# Patient Record
Sex: FEMALE | Race: OTHER | NOT HISPANIC OR LATINO | URBAN - METROPOLITAN AREA
[De-identification: names, ages, dates, MRNs, and addresses within clinical notes are randomized per-mention and may not be internally consistent; named-entity substitution may affect disease eponyms.]

---

## 2023-10-16 ENCOUNTER — APPOINTMENT (OUTPATIENT)
Dept: RADIOLOGY | Facility: HOSPITAL | Age: 68
End: 2023-10-16
Payer: COMMERCIAL

## 2023-10-16 ENCOUNTER — HOSPITAL ENCOUNTER (INPATIENT)
Facility: HOSPITAL | Age: 68
LOS: 3 days | Discharge: HOME | End: 2023-10-19
Attending: INTERNAL MEDICINE | Admitting: INTERNAL MEDICINE
Payer: COMMERCIAL

## 2023-10-16 DIAGNOSIS — R42 DIZZINESS: Primary | ICD-10-CM

## 2023-10-16 LAB
ALBUMIN SERPL BCP-MCNC: 5.2 G/DL (ref 3.4–5)
ALP SERPL-CCNC: 34 U/L (ref 33–136)
ALT SERPL W P-5'-P-CCNC: 33 U/L (ref 7–45)
ANION GAP SERPL CALC-SCNC: 17 MMOL/L (ref 10–20)
APPEARANCE UR: CLEAR
APTT PPP: 29 SECONDS (ref 27–38)
AST SERPL W P-5'-P-CCNC: 43 U/L (ref 9–39)
BASOPHILS # BLD AUTO: 0.05 X10*3/UL (ref 0–0.1)
BASOPHILS NFR BLD AUTO: 0.6 %
BILIRUB SERPL-MCNC: 0.8 MG/DL (ref 0–1.2)
BILIRUB UR STRIP.AUTO-MCNC: NEGATIVE MG/DL
BUN SERPL-MCNC: 10 MG/DL (ref 6–23)
CALCIUM SERPL-MCNC: 10.3 MG/DL (ref 8.6–10.3)
CARDIAC TROPONIN I PNL SERPL HS: 3 NG/L (ref 0–13)
CHLORIDE SERPL-SCNC: 106 MMOL/L (ref 98–107)
CO2 SERPL-SCNC: 22 MMOL/L (ref 21–32)
COLOR UR: ABNORMAL
CREAT SERPL-MCNC: 0.63 MG/DL (ref 0.5–1.05)
EOSINOPHIL # BLD AUTO: 0.07 X10*3/UL (ref 0–0.7)
EOSINOPHIL NFR BLD AUTO: 0.8 %
ERYTHROCYTE [DISTWIDTH] IN BLOOD BY AUTOMATED COUNT: 12.9 % (ref 11.5–14.5)
GFR SERPL CREATININE-BSD FRML MDRD: >90 ML/MIN/1.73M*2
GLUCOSE BLD MANUAL STRIP-MCNC: 123 MG/DL (ref 74–99)
GLUCOSE BLD MANUAL STRIP-MCNC: 193 MG/DL (ref 74–99)
GLUCOSE BLD MANUAL STRIP-MCNC: 93 MG/DL (ref 74–99)
GLUCOSE SERPL-MCNC: 121 MG/DL (ref 74–99)
GLUCOSE UR STRIP.AUTO-MCNC: NEGATIVE MG/DL
HCT VFR BLD AUTO: 47.1 % (ref 36–46)
HGB BLD-MCNC: 16.2 G/DL (ref 12–16)
HOLD SPECIMEN: NORMAL
IMM GRANULOCYTES # BLD AUTO: 0.02 X10*3/UL (ref 0–0.7)
IMM GRANULOCYTES NFR BLD AUTO: 0.2 % (ref 0–0.9)
INR PPP: 1 (ref 0.9–1.1)
KETONES UR STRIP.AUTO-MCNC: ABNORMAL MG/DL
LEUKOCYTE ESTERASE UR QL STRIP.AUTO: NEGATIVE
LIPASE SERPL-CCNC: 35 U/L (ref 9–82)
LIPASE SERPL-CCNC: 44 U/L (ref 9–82)
LYMPHOCYTES # BLD AUTO: 2.24 X10*3/UL (ref 1.2–4.8)
LYMPHOCYTES NFR BLD AUTO: 26.2 %
MAGNESIUM SERPL-MCNC: 1.93 MG/DL (ref 1.6–2.4)
MCH RBC QN AUTO: 30.2 PG (ref 26–34)
MCHC RBC AUTO-ENTMCNC: 34.4 G/DL (ref 32–36)
MCV RBC AUTO: 88 FL (ref 80–100)
MONOCYTES # BLD AUTO: 0.46 X10*3/UL (ref 0.1–1)
MONOCYTES NFR BLD AUTO: 5.4 %
NEUTROPHILS # BLD AUTO: 5.7 X10*3/UL (ref 1.2–7.7)
NEUTROPHILS NFR BLD AUTO: 66.8 %
NITRITE UR QL STRIP.AUTO: NEGATIVE
NRBC BLD-RTO: 0 /100 WBCS (ref 0–0)
PH UR STRIP.AUTO: 9 [PH]
PLATELET # BLD AUTO: 182 X10*3/UL (ref 150–450)
PMV BLD AUTO: 11.3 FL (ref 7.5–11.5)
POCT GLUCOSE: 123 MG/DL (ref 74–99)
POTASSIUM SERPL-SCNC: 5.5 MMOL/L (ref 3.5–5.3)
PROT SERPL-MCNC: 7.7 G/DL (ref 6.4–8.2)
PROT UR STRIP.AUTO-MCNC: NEGATIVE MG/DL
PROTHROMBIN TIME: 11.8 SECONDS (ref 9.8–12.8)
RBC # BLD AUTO: 5.37 X10*6/UL (ref 4–5.2)
RBC # UR STRIP.AUTO: NEGATIVE /UL
SODIUM SERPL-SCNC: 139 MMOL/L (ref 136–145)
SP GR UR STRIP.AUTO: 1.03
UROBILINOGEN UR STRIP.AUTO-MCNC: <2 MG/DL
WBC # BLD AUTO: 8.5 X10*3/UL (ref 4.4–11.3)

## 2023-10-16 PROCEDURE — 82947 ASSAY GLUCOSE BLOOD QUANT: CPT

## 2023-10-16 PROCEDURE — 74176 CT ABD & PELVIS W/O CONTRAST: CPT | Performed by: RADIOLOGY

## 2023-10-16 PROCEDURE — 96375 TX/PRO/DX INJ NEW DRUG ADDON: CPT

## 2023-10-16 PROCEDURE — 99285 EMERGENCY DEPT VISIT HI MDM: CPT | Performed by: INTERNAL MEDICINE

## 2023-10-16 PROCEDURE — 2500000002 HC RX 250 W HCPCS SELF ADMINISTERED DRUGS (ALT 637 FOR MEDICARE OP, ALT 636 FOR OP/ED): Performed by: STUDENT IN AN ORGANIZED HEALTH CARE EDUCATION/TRAINING PROGRAM

## 2023-10-16 PROCEDURE — 81003 URINALYSIS AUTO W/O SCOPE: CPT | Performed by: STUDENT IN AN ORGANIZED HEALTH CARE EDUCATION/TRAINING PROGRAM

## 2023-10-16 PROCEDURE — 2500000004 HC RX 250 GENERAL PHARMACY W/ HCPCS (ALT 636 FOR OP/ED): Performed by: STUDENT IN AN ORGANIZED HEALTH CARE EDUCATION/TRAINING PROGRAM

## 2023-10-16 PROCEDURE — C9113 INJ PANTOPRAZOLE SODIUM, VIA: HCPCS | Performed by: INTERNAL MEDICINE

## 2023-10-16 PROCEDURE — 36415 COLL VENOUS BLD VENIPUNCTURE: CPT | Performed by: INTERNAL MEDICINE

## 2023-10-16 PROCEDURE — 70496 CT ANGIOGRAPHY HEAD: CPT | Performed by: RADIOLOGY

## 2023-10-16 PROCEDURE — 1200000002 HC GENERAL ROOM WITH TELEMETRY DAILY

## 2023-10-16 PROCEDURE — 2580000001 HC RX 258 IV SOLUTIONS: Performed by: STUDENT IN AN ORGANIZED HEALTH CARE EDUCATION/TRAINING PROGRAM

## 2023-10-16 PROCEDURE — 74176 CT ABD & PELVIS W/O CONTRAST: CPT

## 2023-10-16 PROCEDURE — 70553 MRI BRAIN STEM W/O & W/DYE: CPT | Performed by: STUDENT IN AN ORGANIZED HEALTH CARE EDUCATION/TRAINING PROGRAM

## 2023-10-16 PROCEDURE — 80053 COMPREHEN METABOLIC PANEL: CPT | Performed by: INTERNAL MEDICINE

## 2023-10-16 PROCEDURE — 70498 CT ANGIOGRAPHY NECK: CPT

## 2023-10-16 PROCEDURE — 85610 PROTHROMBIN TIME: CPT | Performed by: INTERNAL MEDICINE

## 2023-10-16 PROCEDURE — A9575 INJ GADOTERATE MEGLUMI 0.1ML: HCPCS | Performed by: INTERNAL MEDICINE

## 2023-10-16 PROCEDURE — 83735 ASSAY OF MAGNESIUM: CPT | Performed by: STUDENT IN AN ORGANIZED HEALTH CARE EDUCATION/TRAINING PROGRAM

## 2023-10-16 PROCEDURE — 85730 THROMBOPLASTIN TIME PARTIAL: CPT | Performed by: INTERNAL MEDICINE

## 2023-10-16 PROCEDURE — 2500000001 HC RX 250 WO HCPCS SELF ADMINISTERED DRUGS (ALT 637 FOR MEDICARE OP): Performed by: STUDENT IN AN ORGANIZED HEALTH CARE EDUCATION/TRAINING PROGRAM

## 2023-10-16 PROCEDURE — 85025 COMPLETE CBC W/AUTO DIFF WBC: CPT | Performed by: INTERNAL MEDICINE

## 2023-10-16 PROCEDURE — 70496 CT ANGIOGRAPHY HEAD: CPT

## 2023-10-16 PROCEDURE — 96374 THER/PROPH/DIAG INJ IV PUSH: CPT

## 2023-10-16 PROCEDURE — 84484 ASSAY OF TROPONIN QUANT: CPT | Performed by: INTERNAL MEDICINE

## 2023-10-16 PROCEDURE — 2500000004 HC RX 250 GENERAL PHARMACY W/ HCPCS (ALT 636 FOR OP/ED): Performed by: INTERNAL MEDICINE

## 2023-10-16 PROCEDURE — 2550000001 HC RX 255 CONTRASTS: Performed by: INTERNAL MEDICINE

## 2023-10-16 PROCEDURE — 70553 MRI BRAIN STEM W/O & W/DYE: CPT

## 2023-10-16 PROCEDURE — 83690 ASSAY OF LIPASE: CPT | Performed by: STUDENT IN AN ORGANIZED HEALTH CARE EDUCATION/TRAINING PROGRAM

## 2023-10-16 PROCEDURE — 70498 CT ANGIOGRAPHY NECK: CPT | Performed by: RADIOLOGY

## 2023-10-16 PROCEDURE — 70450 CT HEAD/BRAIN W/O DYE: CPT | Performed by: RADIOLOGY

## 2023-10-16 PROCEDURE — 36415 COLL VENOUS BLD VENIPUNCTURE: CPT | Performed by: STUDENT IN AN ORGANIZED HEALTH CARE EDUCATION/TRAINING PROGRAM

## 2023-10-16 PROCEDURE — 70450 CT HEAD/BRAIN W/O DYE: CPT

## 2023-10-16 RX ORDER — OMEPRAZOLE 40 MG/1
40 CAPSULE, DELAYED RELEASE ORAL
COMMUNITY
Start: 2023-05-26

## 2023-10-16 RX ORDER — ERGOCALCIFEROL 1.25 MG/1
1 CAPSULE ORAL WEEKLY
COMMUNITY
Start: 2023-06-13

## 2023-10-16 RX ORDER — PANTOPRAZOLE SODIUM 40 MG/10ML
40 INJECTION, POWDER, LYOPHILIZED, FOR SOLUTION INTRAVENOUS 2 TIMES DAILY
Status: DISCONTINUED | OUTPATIENT
Start: 2023-10-17 | End: 2023-10-18

## 2023-10-16 RX ORDER — DEXTROSE 50 % IN WATER (D50W) INTRAVENOUS SYRINGE
25
Status: DISCONTINUED | OUTPATIENT
Start: 2023-10-16 | End: 2023-10-17

## 2023-10-16 RX ORDER — PANTOPRAZOLE SODIUM 40 MG/10ML
80 INJECTION, POWDER, LYOPHILIZED, FOR SOLUTION INTRAVENOUS ONCE
Status: COMPLETED | OUTPATIENT
Start: 2023-10-16 | End: 2023-10-16

## 2023-10-16 RX ORDER — MELOXICAM 15 MG/1
1 TABLET ORAL DAILY PRN
COMMUNITY
Start: 2023-08-22

## 2023-10-16 RX ORDER — LORAZEPAM 2 MG/ML
0.5 INJECTION INTRAMUSCULAR ONCE
Status: COMPLETED | OUTPATIENT
Start: 2023-10-16 | End: 2023-10-16

## 2023-10-16 RX ORDER — DIPHENHYDRAMINE HYDROCHLORIDE 50 MG/ML
12.5 INJECTION INTRAMUSCULAR; INTRAVENOUS ONCE
Status: COMPLETED | OUTPATIENT
Start: 2023-10-16 | End: 2023-10-16

## 2023-10-16 RX ORDER — SODIUM CHLORIDE, SODIUM LACTATE, POTASSIUM CHLORIDE, CALCIUM CHLORIDE 600; 310; 30; 20 MG/100ML; MG/100ML; MG/100ML; MG/100ML
100 INJECTION, SOLUTION INTRAVENOUS CONTINUOUS
Status: DISCONTINUED | OUTPATIENT
Start: 2023-10-16 | End: 2023-10-17

## 2023-10-16 RX ORDER — METOCLOPRAMIDE HYDROCHLORIDE 5 MG/ML
10 INJECTION INTRAMUSCULAR; INTRAVENOUS ONCE
Status: COMPLETED | OUTPATIENT
Start: 2023-10-16 | End: 2023-10-16

## 2023-10-16 RX ORDER — ATORVASTATIN CALCIUM 80 MG/1
80 TABLET, FILM COATED ORAL NIGHTLY
Status: DISCONTINUED | OUTPATIENT
Start: 2023-10-16 | End: 2023-10-19 | Stop reason: HOSPADM

## 2023-10-16 RX ORDER — DEXTROSE MONOHYDRATE 100 MG/ML
0.3 INJECTION, SOLUTION INTRAVENOUS ONCE AS NEEDED
Status: DISCONTINUED | OUTPATIENT
Start: 2023-10-16 | End: 2023-10-16

## 2023-10-16 RX ORDER — ALENDRONATE SODIUM 70 MG/1
1 TABLET ORAL
COMMUNITY
Start: 2023-04-24

## 2023-10-16 RX ORDER — INSULIN LISPRO 100 [IU]/ML
0-5 INJECTION, SOLUTION INTRAVENOUS; SUBCUTANEOUS
Status: DISCONTINUED | OUTPATIENT
Start: 2023-10-16 | End: 2023-10-19 | Stop reason: HOSPADM

## 2023-10-16 RX ORDER — METFORMIN HYDROCHLORIDE 500 MG/1
1000 TABLET, EXTENDED RELEASE ORAL
COMMUNITY
Start: 2023-03-06

## 2023-10-16 RX ORDER — ATORVASTATIN CALCIUM 40 MG/1
40 TABLET, FILM COATED ORAL
COMMUNITY
Start: 2023-10-09

## 2023-10-16 RX ORDER — CALCIUM CARBONATE/VITAMIN D3 600MG-5MCG
1 TABLET ORAL DAILY
COMMUNITY
Start: 2021-04-22

## 2023-10-16 RX ORDER — GADOTERATE MEGLUMINE 376.9 MG/ML
11 INJECTION INTRAVENOUS
Status: COMPLETED | OUTPATIENT
Start: 2023-10-16 | End: 2023-10-16

## 2023-10-16 RX ORDER — LANOLIN ALCOHOL/MO/W.PET/CERES
1000 CREAM (GRAM) TOPICAL
COMMUNITY
Start: 2017-11-02

## 2023-10-16 RX ADMIN — METOCLOPRAMIDE 10 MG: 5 INJECTION, SOLUTION INTRAMUSCULAR; INTRAVENOUS at 14:12

## 2023-10-16 RX ADMIN — DIPHENHYDRAMINE HYDROCHLORIDE 12.5 MG: 50 INJECTION, SOLUTION INTRAMUSCULAR; INTRAVENOUS at 14:12

## 2023-10-16 RX ADMIN — ATORVASTATIN CALCIUM 40 MG: 80 TABLET, FILM COATED ORAL at 20:21

## 2023-10-16 RX ADMIN — PANTOPRAZOLE SODIUM 80 MG: 40 INJECTION, POWDER, FOR SOLUTION INTRAVENOUS at 14:41

## 2023-10-16 RX ADMIN — SODIUM CHLORIDE, POTASSIUM CHLORIDE, SODIUM LACTATE AND CALCIUM CHLORIDE 100 ML/HR: 600; 310; 30; 20 INJECTION, SOLUTION INTRAVENOUS at 20:21

## 2023-10-16 RX ADMIN — SODIUM CHLORIDE, POTASSIUM CHLORIDE, SODIUM LACTATE AND CALCIUM CHLORIDE 1000 ML: 600; 310; 30; 20 INJECTION, SOLUTION INTRAVENOUS at 17:57

## 2023-10-16 RX ADMIN — LORAZEPAM 0.5 MG: 2 INJECTION INTRAMUSCULAR; INTRAVENOUS at 18:57

## 2023-10-16 RX ADMIN — GADOTERATE MEGLUMINE 11 ML: 376.9 INJECTION INTRAVENOUS at 19:49

## 2023-10-16 RX ADMIN — IOHEXOL 100 ML: 350 INJECTION, SOLUTION INTRAVENOUS at 12:34

## 2023-10-16 RX ADMIN — INSULIN LISPRO 1 UNITS: 100 INJECTION, SOLUTION INTRAVENOUS; SUBCUTANEOUS at 18:26

## 2023-10-16 SDOH — SOCIAL STABILITY: SOCIAL INSECURITY: HAVE YOU HAD THOUGHTS OF HARMING ANYONE ELSE?: NO

## 2023-10-16 SDOH — SOCIAL STABILITY: SOCIAL INSECURITY: ARE THERE ANY APPARENT SIGNS OF INJURIES/BEHAVIORS THAT COULD BE RELATED TO ABUSE/NEGLECT?: NO

## 2023-10-16 SDOH — SOCIAL STABILITY: SOCIAL INSECURITY: ARE YOU OR HAVE YOU BEEN THREATENED OR ABUSED PHYSICALLY, EMOTIONALLY, OR SEXUALLY BY ANYONE?: NO

## 2023-10-16 SDOH — SOCIAL STABILITY: SOCIAL INSECURITY: ABUSE: ADULT

## 2023-10-16 SDOH — SOCIAL STABILITY: SOCIAL INSECURITY: WERE YOU ABLE TO COMPLETE ALL THE BEHAVIORAL HEALTH SCREENINGS?: YES

## 2023-10-16 SDOH — SOCIAL STABILITY: SOCIAL INSECURITY: DOES ANYONE TRY TO KEEP YOU FROM HAVING/CONTACTING OTHER FRIENDS OR DOING THINGS OUTSIDE YOUR HOME?: NO

## 2023-10-16 SDOH — SOCIAL STABILITY: SOCIAL INSECURITY: HAS ANYONE EVER THREATENED TO HURT YOUR FAMILY OR YOUR PETS?: NO

## 2023-10-16 SDOH — SOCIAL STABILITY: SOCIAL INSECURITY: DO YOU FEEL ANYONE HAS EXPLOITED OR TAKEN ADVANTAGE OF YOU FINANCIALLY OR OF YOUR PERSONAL PROPERTY?: NO

## 2023-10-16 SDOH — SOCIAL STABILITY: SOCIAL INSECURITY: DO YOU FEEL UNSAFE GOING BACK TO THE PLACE WHERE YOU ARE LIVING?: NO

## 2023-10-16 ASSESSMENT — COGNITIVE AND FUNCTIONAL STATUS - GENERAL
WALKING IN HOSPITAL ROOM: A LITTLE
DRESSING REGULAR UPPER BODY CLOTHING: A LITTLE
CLIMB 3 TO 5 STEPS WITH RAILING: A LITTLE
TURNING FROM BACK TO SIDE WHILE IN FLAT BAD: A LITTLE
TURNING FROM BACK TO SIDE WHILE IN FLAT BAD: A LITTLE
TOILETING: A LITTLE
PATIENT BASELINE BEDBOUND: NO
CLIMB 3 TO 5 STEPS WITH RAILING: A LITTLE
DAILY ACTIVITIY SCORE: 22
MOVING TO AND FROM BED TO CHAIR: A LITTLE
MOBILITY SCORE: 20
DAILY ACTIVITIY SCORE: 21
MOVING TO AND FROM BED TO CHAIR: A LITTLE
HELP NEEDED FOR BATHING: A LITTLE
WALKING IN HOSPITAL ROOM: A LITTLE
TOILETING: A LITTLE
MOBILITY SCORE: 20
HELP NEEDED FOR BATHING: A LITTLE

## 2023-10-16 ASSESSMENT — COLUMBIA-SUICIDE SEVERITY RATING SCALE - C-SSRS
6. HAVE YOU EVER DONE ANYTHING, STARTED TO DO ANYTHING, OR PREPARED TO DO ANYTHING TO END YOUR LIFE?: NO
1. IN THE PAST MONTH, HAVE YOU WISHED YOU WERE DEAD OR WISHED YOU COULD GO TO SLEEP AND NOT WAKE UP?: NO
6. HAVE YOU EVER DONE ANYTHING, STARTED TO DO ANYTHING, OR PREPARED TO DO ANYTHING TO END YOUR LIFE?: NO
2. HAVE YOU ACTUALLY HAD ANY THOUGHTS OF KILLING YOURSELF?: NO
2. HAVE YOU ACTUALLY HAD ANY THOUGHTS OF KILLING YOURSELF?: NO
1. IN THE PAST MONTH, HAVE YOU WISHED YOU WERE DEAD OR WISHED YOU COULD GO TO SLEEP AND NOT WAKE UP?: NO

## 2023-10-16 ASSESSMENT — LIFESTYLE VARIABLES
SUBSTANCE_ABUSE_PAST_12_MONTHS: NO
AUDIT-C TOTAL SCORE: 0
HAVE PEOPLE ANNOYED YOU BY CRITICIZING YOUR DRINKING: NO
HOW OFTEN DO YOU HAVE A DRINK CONTAINING ALCOHOL: NEVER
EVER HAD A DRINK FIRST THING IN THE MORNING TO STEADY YOUR NERVES TO GET RID OF A HANGOVER: NO
HOW OFTEN DO YOU HAVE A DRINK CONTAINING ALCOHOL: NEVER
HOW OFTEN DO YOU HAVE 6 OR MORE DRINKS ON ONE OCCASION: NEVER
HOW MANY STANDARD DRINKS CONTAINING ALCOHOL DO YOU HAVE ON A TYPICAL DAY: PATIENT DOES NOT DRINK
AUDIT-C TOTAL SCORE: 0
HAVE YOU EVER FELT YOU SHOULD CUT DOWN ON YOUR DRINKING: NO
SKIP TO QUESTIONS 9-10: 1
AUDIT-C TOTAL SCORE: 0
EVER FELT BAD OR GUILTY ABOUT YOUR DRINKING: NO
HOW MANY STANDARD DRINKS CONTAINING ALCOHOL DO YOU HAVE ON A TYPICAL DAY: PATIENT DOES NOT DRINK
SKIP TO QUESTIONS 9-10: 1
PRESCIPTION_ABUSE_PAST_12_MONTHS: NO
HOW OFTEN DO YOU HAVE 6 OR MORE DRINKS ON ONE OCCASION: NEVER
AUDIT-C TOTAL SCORE: 0

## 2023-10-16 ASSESSMENT — PATIENT HEALTH QUESTIONNAIRE - PHQ9
2. FEELING DOWN, DEPRESSED OR HOPELESS: NOT AT ALL
1. LITTLE INTEREST OR PLEASURE IN DOING THINGS: NOT AT ALL
SUM OF ALL RESPONSES TO PHQ9 QUESTIONS 1 & 2: 0
SUM OF ALL RESPONSES TO PHQ9 QUESTIONS 1 & 2: 0
2. FEELING DOWN, DEPRESSED OR HOPELESS: NOT AT ALL
1. LITTLE INTEREST OR PLEASURE IN DOING THINGS: NOT AT ALL

## 2023-10-16 ASSESSMENT — ACTIVITIES OF DAILY LIVING (ADL)
JUDGMENT_ADEQUATE_SAFELY_COMPLETE_DAILY_ACTIVITIES: YES
HEARING - RIGHT EAR: FUNCTIONAL
ADEQUATE_TO_COMPLETE_ADL: YES
PATIENT'S MEMORY ADEQUATE TO SAFELY COMPLETE DAILY ACTIVITIES?: YES
BATHING: NEEDS ASSISTANCE
WALKS IN HOME: NEEDS ASSISTANCE
DRESSING YOURSELF: NEEDS ASSISTANCE
HEARING - LEFT EAR: FUNCTIONAL
FEEDING YOURSELF: INDEPENDENT
GROOMING: INDEPENDENT
TOILETING: NEEDS ASSISTANCE
LACK_OF_TRANSPORTATION: NO

## 2023-10-16 ASSESSMENT — PAIN - FUNCTIONAL ASSESSMENT
PAIN_FUNCTIONAL_ASSESSMENT: 0-10
PAIN_FUNCTIONAL_ASSESSMENT: 0-10

## 2023-10-16 ASSESSMENT — PAIN SCALES - GENERAL
PAINLEVEL_OUTOF10: 0 - NO PAIN
PAINLEVEL_OUTOF10: 4

## 2023-10-16 NOTE — H&P
"HPI:  Rola Esparza is a 68 y.o. year old female with a history of hypertension, hyperlipidemia, chronic dizziness of unclear etiology and type 2 diabetes who presented to [facility] for persistent dizziness and right arm numbness.  There is a language barrier while conversing with the patient, family at bedside providing all significant history.  Daughter reports a recent cholecystectomy about 2 weeks ago at [facility] after which patient was doing well postoperatively.  Over the last 2 days, patient has noted persistent dizziness which is different than her baseline vertigo.  She describes sensation as \"room spinning\" with associated right upper extremity numbness and tingling in addition to a generalized headache.  Patient is also complaining of epigastric pain radiating to her back with a single episode of nonbloody emesis prior to arrival to ER. Patient denies fevers, chills, chest pain, palpitations, dyspnea, peripheral edema, syncope or falls.  Denies any daily use of aspirin, or NSAID products.    In the ER, /84 and .  Per ER note, initial NIH was 0.  Labs with Hgb 16, potassium 5.5.  CT/CTA negative for acute infarct, stenosis or large vessel cutoff.  Patient was given IV 80 mg Protonix, 10 mg Reglan and 12.5 mg Benadryl in the ER.  Will be admitted for further management.    A 10 point ROS was performed with the patient denying any complaint at this time aside from those listed in the HPI above.     Past Medical History: as above  Past Surgical History: cholecystectomy (2 weeks ago)  Family History: noncontributory  Social History: denies smoking, EtOH, illicit drug use    Vitals:    10/16/23 1500   BP: 113/79   Pulse: 101   Resp: 17   Temp:    SpO2: 96%        Medications:  atorvastatin, 80 mg, oral, Nightly  insulin lispro, 0-5 Units, subcutaneous, TID with meals  lactated Ringer's, 500 mL, intravenous, Once      Physical Exam:   GENERAL: Awake, cooperative, no apparent distress.  HEAD:  Normocephalic, " atraumatic.  EYES:  Round and reactive.  ENT:  No nasal discharge, mucous membranes dry.  NECK:  Atraumatic, no meningismus.  CARDIOVASCULAR: Tachycardic, regular rate,, no murmur.  RESPIRATORY: CTAB, no active work of breathing.   ABDOMEN:  Soft, no tenderness, nondistended.  EXTREMITIES:  No peripheral edema, no calf tenderness.  SKIN:  Warm and dry.  NEUROLOGICAL: Awake/alert, no evidence of dysarthria, motor and sensation intact and symmetrical bilaterally.    Assessment/ Plan:  #Acute CVA r/o  #Vertigo  Admission CT/CTA negative for acute infarct, large vessel cutoff or stenosis.  NIH 0 but subjectively complaining of R UE numbness (sx improving overall)  -Holding aspirin due to possible GIB as below  -Medical management with Lipitor.  A1c WNL recently, obtain lipid panel.  -Neurochecks, allow for permissive HTN for 24h  -Echocardiogram ordered, no previous to review.  -MRI brain ordered.  Neuro is consulted, follow-up recs.  -PT/OT and SLP eval before advancing diet.    #Epigastric pain, suspect dyspepsia vs pancreatitis, less likely GI bleed  Patient with recent cholecystectomy, alternating constipation/diarrhea since.  Unclear if patient takes meloxicam at home (in med rec)  -Follow-up lipase, CT abdomen/pelvis  -Received IV 80 mg Protonix in the ER, continue IV 40 BID from tomorrow depending course.    #Dehydration  #Hyperkalemia  -Fluid bolus and continuous IVF for now, trend labs    #Essential hypertension  #Hyperlipidemia  #Type 2 diabetes  -Holding home antihypertensives, sliding scale coverage for now.    Diet: npo until SLP  DVT ppx: -  IVF:  cc/h  Consults: neurology  Dispo: telemetry    Zane Lee,    Internal Medicine PGY-3

## 2023-10-16 NOTE — PROGRESS NOTES
Pharmacy Medication History Review    Rola Esparza is a 68 y.o. female admitted for No Principal Problem: There is no principal problem currently on the Problem List. Please update the Problem List and refresh.. Pharmacy reviewed the patient's [id number] medications and allergies for accuracy.    The list below reflectives the updated PTA list. Please review each medication in order reconciliation for additional clarification and justification.  (Not in a hospital admission)       The list below reflectives the updated allergy list. Please review each documented allergy for additional clarification and justification.  Allergies  Reviewed by Rosie Anderson CPhT on 10/16/2023   No Known Allergies         Below are additional concerns with the patient's PTA list.  See PTA med list    Rosie Anderson CPhT

## 2023-10-16 NOTE — ED PROVIDER NOTES
HPI   Chief Complaint   Patient presents with    Stroke     Pt having stroke like symptoms; last known well 1.5 hours ago. C/O DIZZINESS, ARM TINGLING/ NUMBNESS        Patient presenting for evaluation of dizziness and arm tingling.  Patient was activated as a code stroke in triage.  On my evaluation IVs  and the patient indicates that Saturday she noted dizziness and headache.  Patient states she had tingling in her left arm.    Patient's daughter indicates she has a history of dizziness however the tingling in her left arm is new.      History provided by:  Patient and relative   used: Yes                        [place] Coma Scale Score: 15                  Patient History   Past Medical History:   Diagnosis Date    Arthritis     Diabetes mellitus (CMS/HCC)     High cholesterol      Past Surgical History:   Procedure Laterality Date    CHOLECYSTECTOMY       No family history on file.  Social History     Tobacco Use    Smoking status: Never    Smokeless tobacco: Never   Substance Use Topics    Alcohol use: Never    Drug use: Never       Physical Exam   ED Triage Vitals [10/16/23 1227]   Temp Heart Rate Resp BP   36.2 °C (97.2 °F) (!) 118 18 137/84      SpO2 Temp Source Heart Rate Source Patient Position   98 % Temporal Monitor --      BP Location FiO2 (%)     -- --       Physical Exam  Vitals and nursing note reviewed.   Constitutional:       Appearance: Normal appearance.   HENT:      Head: Atraumatic.      Right Ear: External ear normal.      Left Ear: External ear normal.      Nose: Nose normal.      Mouth/Throat:      Mouth: Mucous membranes are moist.   Eyes:      Extraocular Movements: Extraocular movements intact.      Pupils: Pupils are equal, round, and reactive to light.   Cardiovascular:      Rate and Rhythm: Normal rate and regular rhythm.      Pulses: Normal pulses.   Pulmonary:      Effort: Pulmonary effort is normal.      Breath sounds: Normal breath sounds.    Abdominal:      Palpations: Abdomen is soft.      Tenderness: There is no abdominal tenderness.   Musculoskeletal:         General: No tenderness. Normal range of motion.      Cervical back: Normal range of motion and neck supple. No rigidity or tenderness.   Skin:     General: Skin is warm and dry.   Neurological:      General: No focal deficit present.      Mental Status: She is alert and oriented to person, place, and time. Mental status is at baseline.      Cranial Nerves: Cranial nerves 2-12 are intact.      Sensory: Sensation is intact.      Motor: Motor function is intact.      Coordination: Coordination is intact.   Psychiatric:         Mood and Affect: Mood is anxious.         Behavior: Behavior normal.         ED Course & MDM   ED Course as of 10/19/23 1557   Mon Oct 16, 2023   1243 Received call from radiology indicating that the CT head was negative for intracerebral hemorrhage. [JA]   1426 On reevaluation patient indicates that she is having some epigastric pain.  Patient notes that she has a history of acid reflux.  Patient takes 40 mg of omeprazole daily.  Patient also notes ongoing neck pain.  Patient states the neck pain is not new. [JA]   1426 Patient and family agree with plan of admission. [JA]      ED Course User Index  [JA] Jai Mendoza DO         Diagnoses as of 10/19/23 1557   Dizziness       Medical Decision Making  Out of window for TNK.  NIH 0.  No infectious process found in the ED.  Patient mated for further evaluation.      VAN negative  NIH Stroke Scale      Interval: Baseline    Person Administering Scale: Jai Mendoza DO    Administer stroke scale items in the order listed. Record performance in each category after each subscale exam. Do not go back and change scores. Follow directions provided for each exam technique. Scores should reflect what the patient does, not what the clinician thinks the patient can do. The clinician should record answers while administering the exam  and work quickly. Except where indicated, the patient should not be coached (i.e., repeated requests to patient to make a special effort).      1a  Level of consciousness: 0=alert; keenly responsive  1b. LOC questions:  0=Performs both tasks correctly  1c. LOC commands: 0=Performs both tasks correctly  2.  Best Gaze: 0=normal  3.  Visual: 0=No visual loss  4. Facial Palsy: 0=Normal symmetric movement  5a.  Motor left arm: 0=No drift, limb holds 90 (or 45) degrees for full 10 seconds  5b.  Motor right arm: 0=No drift, limb holds 90 (or 45) degrees for full 10 seconds  6a. motor left le=No drift, limb holds 90 (or 45) degrees for full 10 seconds  6b  Motor right le=No drift, limb holds 90 (or 45) degrees for full 10 seconds  7. Limb Ataxia: 0=Absent  8.  Sensory: 0=Normal; no sensory loss  9. Best Language:  0=No aphasia, normal  10. Dysarthria: 0=Normal  11. Extinction and Inattention: 0=No abnormality  12. Distal motor function: 0=Normal   Total:   0            Procedure  ECG 12 lead    Performed by: Jai Mendoza DO  Authorized by: Jai Mendoza DO    ECG reviewed by ED Physician in the absence of a cardiologist: yes    Previous ECG:     Previous ECG:  Unavailable  Comments:      10/16/2023 sinus rhythm, rate 89, left axis deviation, left anterior fascicular block, ST segments normal, diffuse T wave flattening, abnormal EKG.  EKG interpreted by myself.       Jai Mendoza DO  10/19/23 4383

## 2023-10-17 ENCOUNTER — APPOINTMENT (OUTPATIENT)
Dept: CARDIOLOGY | Facility: HOSPITAL | Age: 68
End: 2023-10-17
Payer: COMMERCIAL

## 2023-10-17 LAB
ANION GAP SERPL CALC-SCNC: 10 MMOL/L (ref 10–20)
BUN SERPL-MCNC: 9 MG/DL (ref 6–23)
CALCIUM SERPL-MCNC: 9 MG/DL (ref 8.6–10.3)
CHLORIDE SERPL-SCNC: 109 MMOL/L (ref 98–107)
CHOLEST SERPL-MCNC: 107 MG/DL (ref 0–199)
CHOLESTEROL/HDL RATIO: 2.9
CO2 SERPL-SCNC: 25 MMOL/L (ref 21–32)
CREAT SERPL-MCNC: 0.55 MG/DL (ref 0.5–1.05)
EJECTION FRACTION APICAL 4 CHAMBER: 55.1
ERYTHROCYTE [DISTWIDTH] IN BLOOD BY AUTOMATED COUNT: 12.9 % (ref 11.5–14.5)
GFR SERPL CREATININE-BSD FRML MDRD: >90 ML/MIN/1.73M*2
GLUCOSE BLD MANUAL STRIP-MCNC: 113 MG/DL (ref 74–99)
GLUCOSE BLD MANUAL STRIP-MCNC: 135 MG/DL (ref 74–99)
GLUCOSE BLD MANUAL STRIP-MCNC: 154 MG/DL (ref 74–99)
GLUCOSE SERPL-MCNC: 106 MG/DL (ref 74–99)
HCT VFR BLD AUTO: 39.8 % (ref 36–46)
HDLC SERPL-MCNC: 37.3 MG/DL
HGB BLD-MCNC: 13 G/DL (ref 12–16)
LDLC SERPL CALC-MCNC: 56 MG/DL
MAGNESIUM SERPL-MCNC: 1.76 MG/DL (ref 1.6–2.4)
MCH RBC QN AUTO: 29.5 PG (ref 26–34)
MCHC RBC AUTO-ENTMCNC: 32.7 G/DL (ref 32–36)
MCV RBC AUTO: 90 FL (ref 80–100)
NON HDL CHOLESTEROL: 70 MG/DL (ref 0–149)
NRBC BLD-RTO: 0 /100 WBCS (ref 0–0)
PLATELET # BLD AUTO: 209 X10*3/UL (ref 150–450)
PMV BLD AUTO: 10.2 FL (ref 7.5–11.5)
POTASSIUM SERPL-SCNC: 4.1 MMOL/L (ref 3.5–5.3)
RBC # BLD AUTO: 4.41 X10*6/UL (ref 4–5.2)
SODIUM SERPL-SCNC: 140 MMOL/L (ref 136–145)
TRIGL SERPL-MCNC: 70 MG/DL (ref 0–149)
VLDL: 14 MG/DL (ref 0–40)
WBC # BLD AUTO: 7.5 X10*3/UL (ref 4.4–11.3)

## 2023-10-17 PROCEDURE — 2500000001 HC RX 250 WO HCPCS SELF ADMINISTERED DRUGS (ALT 637 FOR MEDICARE OP)

## 2023-10-17 PROCEDURE — C9113 INJ PANTOPRAZOLE SODIUM, VIA: HCPCS | Performed by: STUDENT IN AN ORGANIZED HEALTH CARE EDUCATION/TRAINING PROGRAM

## 2023-10-17 PROCEDURE — 85027 COMPLETE CBC AUTOMATED: CPT | Performed by: STUDENT IN AN ORGANIZED HEALTH CARE EDUCATION/TRAINING PROGRAM

## 2023-10-17 PROCEDURE — 80061 LIPID PANEL: CPT | Performed by: STUDENT IN AN ORGANIZED HEALTH CARE EDUCATION/TRAINING PROGRAM

## 2023-10-17 PROCEDURE — 93306 TTE W/DOPPLER COMPLETE: CPT | Performed by: INTERNAL MEDICINE

## 2023-10-17 PROCEDURE — 83735 ASSAY OF MAGNESIUM: CPT | Performed by: STUDENT IN AN ORGANIZED HEALTH CARE EDUCATION/TRAINING PROGRAM

## 2023-10-17 PROCEDURE — 80048 BASIC METABOLIC PNL TOTAL CA: CPT | Performed by: STUDENT IN AN ORGANIZED HEALTH CARE EDUCATION/TRAINING PROGRAM

## 2023-10-17 PROCEDURE — 99223 1ST HOSP IP/OBS HIGH 75: CPT

## 2023-10-17 PROCEDURE — 2500000004 HC RX 250 GENERAL PHARMACY W/ HCPCS (ALT 636 FOR OP/ED): Performed by: STUDENT IN AN ORGANIZED HEALTH CARE EDUCATION/TRAINING PROGRAM

## 2023-10-17 PROCEDURE — 2500000001 HC RX 250 WO HCPCS SELF ADMINISTERED DRUGS (ALT 637 FOR MEDICARE OP): Performed by: STUDENT IN AN ORGANIZED HEALTH CARE EDUCATION/TRAINING PROGRAM

## 2023-10-17 PROCEDURE — 82947 ASSAY GLUCOSE BLOOD QUANT: CPT

## 2023-10-17 PROCEDURE — 93306 TTE W/DOPPLER COMPLETE: CPT

## 2023-10-17 PROCEDURE — 1200000002 HC GENERAL ROOM WITH TELEMETRY DAILY

## 2023-10-17 PROCEDURE — 36415 COLL VENOUS BLD VENIPUNCTURE: CPT | Performed by: STUDENT IN AN ORGANIZED HEALTH CARE EDUCATION/TRAINING PROGRAM

## 2023-10-17 PROCEDURE — 2580000001 HC RX 258 IV SOLUTIONS: Performed by: STUDENT IN AN ORGANIZED HEALTH CARE EDUCATION/TRAINING PROGRAM

## 2023-10-17 RX ORDER — MAGNESIUM SULFATE HEPTAHYDRATE 40 MG/ML
2 INJECTION, SOLUTION INTRAVENOUS ONCE
Status: COMPLETED | OUTPATIENT
Start: 2023-10-17 | End: 2023-10-17

## 2023-10-17 RX ORDER — MECLIZINE HYDROCHLORIDE 25 MG/1
12.5 TABLET ORAL EVERY 6 HOURS
Status: DISCONTINUED | OUTPATIENT
Start: 2023-10-17 | End: 2023-10-18

## 2023-10-17 RX ADMIN — MECLIZINE HYDROCHLORIDE 12.5 MG: 25 TABLET ORAL at 15:55

## 2023-10-17 RX ADMIN — SODIUM CHLORIDE, POTASSIUM CHLORIDE, SODIUM LACTATE AND CALCIUM CHLORIDE 100 ML/HR: 600; 310; 30; 20 INJECTION, SOLUTION INTRAVENOUS at 05:18

## 2023-10-17 RX ADMIN — ATORVASTATIN CALCIUM 80 MG: 80 TABLET, FILM COATED ORAL at 20:15

## 2023-10-17 RX ADMIN — INSULIN LISPRO 1 UNITS: 100 INJECTION, SOLUTION INTRAVENOUS; SUBCUTANEOUS at 17:25

## 2023-10-17 RX ADMIN — MAGNESIUM SULFATE HEPTAHYDRATE 2 G: 40 INJECTION, SOLUTION INTRAVENOUS at 11:58

## 2023-10-17 RX ADMIN — PANTOPRAZOLE SODIUM 40 MG: 40 INJECTION, POWDER, FOR SOLUTION INTRAVENOUS at 11:57

## 2023-10-17 RX ADMIN — PANTOPRAZOLE SODIUM 40 MG: 40 INJECTION, POWDER, FOR SOLUTION INTRAVENOUS at 20:15

## 2023-10-17 RX ADMIN — MECLIZINE HYDROCHLORIDE 12.5 MG: 25 TABLET ORAL at 20:15

## 2023-10-17 ASSESSMENT — ENCOUNTER SYMPTOMS
RESPIRATORY NEGATIVE: 1
PSYCHIATRIC NEGATIVE: 1
ALLERGIC/IMMUNOLOGIC NEGATIVE: 1
EYES NEGATIVE: 1
GASTROINTESTINAL NEGATIVE: 1
CARDIOVASCULAR NEGATIVE: 1
DIZZINESS: 1
MUSCULOSKELETAL NEGATIVE: 1
ENDOCRINE NEGATIVE: 1
HEMATOLOGIC/LYMPHATIC NEGATIVE: 1
CONSTITUTIONAL NEGATIVE: 1

## 2023-10-17 ASSESSMENT — COGNITIVE AND FUNCTIONAL STATUS - GENERAL
MOVING TO AND FROM BED TO CHAIR: A LITTLE
DAILY ACTIVITIY SCORE: 23
MOVING TO AND FROM BED TO CHAIR: A LITTLE
TOILETING: A LITTLE
CLIMB 3 TO 5 STEPS WITH RAILING: A LITTLE
STANDING UP FROM CHAIR USING ARMS: A LITTLE
WALKING IN HOSPITAL ROOM: A LITTLE
STANDING UP FROM CHAIR USING ARMS: A LITTLE
TOILETING: A LITTLE
WALKING IN HOSPITAL ROOM: A LITTLE
DAILY ACTIVITIY SCORE: 23
TURNING FROM BACK TO SIDE WHILE IN FLAT BAD: A LITTLE
MOBILITY SCORE: 19
MOBILITY SCORE: 20
CLIMB 3 TO 5 STEPS WITH RAILING: A LITTLE

## 2023-10-17 ASSESSMENT — PAIN - FUNCTIONAL ASSESSMENT
PAIN_FUNCTIONAL_ASSESSMENT: 0-10
PAIN_FUNCTIONAL_ASSESSMENT: 0-10

## 2023-10-17 ASSESSMENT — PAIN SCALES - GENERAL
PAINLEVEL_OUTOF10: 0 - NO PAIN
PAINLEVEL_OUTOF10: 0 - NO PAIN

## 2023-10-17 ASSESSMENT — ACTIVITIES OF DAILY LIVING (ADL): LACK_OF_TRANSPORTATION: NO

## 2023-10-17 NOTE — CARE PLAN
Problem: Fall/Injury  Goal: Not fall by end of shift  Outcome: Progressing  Goal: Be free from injury by end of the shift  Outcome: Progressing     Problem: Daily Care  Goal: Daily care needs are met  Outcome: Progressing   The patient's goals for the shift include free from falls    The clinical goals for the shift include pt to remain free from falls

## 2023-10-17 NOTE — PROGRESS NOTES
10/17/23 1249   Discharge Planning   Living Arrangements Family members   Support Systems Family members   Type of Residence Private residence   Do you have animals or pets at home? No   Who is requesting discharge planning? Provider   Home or Post Acute Services None   Does the patient need discharge transport arranged? No   Housing Stability   In the last 12 months, was there a time when you were not able to pay the mortgage or rent on time? N   In the last 12 months, was there a time when you did not have a steady place to sleep or slept in a shelter (including now)? N   Transportation Needs   In the past 12 months, has lack of transportation kept you from medical appointments or from getting medications? no   In the past 12 months, has lack of transportation kept you from meetings, work, or from getting things needed for daily living? No     Met with pt, language barrier noted, family at bedside,  plan is for home at discharge.  Currently pt with severe dizziness, therapy on hold, will follow if home care is needed or out patient vertigo clinic, also await neuro's impression.  Vilma Hernandez RN

## 2023-10-17 NOTE — NURSING NOTE
10/17/23 8450 Patient Navigator  The patient speaks Italian- the Martti was used. I introduced myself to the patient and explained my role. Pt states she lives alone and has the help of her 2 daughters as needed. She takes Metformin 2 tablets in the morning and 2 with dinner. She was diagnosed with diabetes 4 years ago and does daily blood sugars- declined demonstration. She states she walks when the weather is good outside and in the winter she walks the halls in her apartment complex. I informed the patient of the recommendations of 30 minutes 3 times a week. Pt states she eats a healthy diet. I reviewed the contents of the Stroke Folder and answered his/her questions. I reviewed the following lab values and what they indicate: cholesterol, HDL, LDL, triglycerides, and A1C. I gave the patient my business card & instructed them to call me as needed. She verbalized understanding declined any other questions. See the handouts listed below which we reviewed.    Handouts:  What can I eat? American Diabetes Association  Lifestyle changes to prevent a stroke- American Stroke Association  The connection between diabetes & stroke- American Stroke Association      Jasmine WEINER, RN  Patient Navigator  Stroke Educator  Diabetes Care &

## 2023-10-17 NOTE — PROGRESS NOTES
Rola Esparza is a 68 y.o. female on day 1 of admission presenting with Dizziness.      Subjective  Patient was seen at bedside today, she reported some improvement in her dizziness since admission.  She still reported mild headache.  Denies any chest pain, shortness of breath, abdominal pain.       Objective     Last Recorded Vitals  /80 (BP Location: Left arm, Patient Position: Lying)   Pulse 92   Temp 36.3 °C (97.3 °F) (Temporal)   Resp 16   Wt 56.7 kg (125 lb)   SpO2 97%   Intake/Output last 3 Shifts:    Intake/Output Summary (Last 24 hours) at 10/17/2023 1537  Last data filed at 10/17/2023 0515  Gross per 24 hour   Intake 1890 ml   Output --   Net 1890 ml       Admission Weight  Weight: 59 kg (130 lb) (10/16/23 1227)    Daily Weight  10/16/23 : 56.7 kg (125 lb)    Physical Exam  GENERAL: Awake, cooperative, no apparent distress.  HEAD:  Normocephalic, atraumatic.  EYES:  Round and reactive.  ENT:  No nasal discharge, mucous membranes dry.  NECK:  Atraumatic, no meningismus.  CARDIOVASCULAR: Tachycardic, regular rate,, no murmur.  RESPIRATORY: CTAB, no active work of breathing.   ABDOMEN:  Soft, no tenderness, nondistended.  EXTREMITIES:  No peripheral edema, no calf tenderness.  SKIN:  Warm and dry.  NEUROLOGICAL: Awake/alert, no evidence of dysarthria, motor and sensation intact and symmetrical bilaterally.         Assessment/Plan   Patient is a 68-year-old female with a past medical history of hypertension, hyperlipidemia, chronic dizziness, type 2 diabetes mellitus who presented to [facility] for dizziness and right arm numbness.    #Vertigo  #Vestibular migraine  CVA ruled out  -MRI brain negative for infarct.  -Neurology consulted  -Meclizine 12.5 mg every 6 hours  -Pending echo  -Patient will likely need vestibular therapy outpatient     #Dehydration  #Hyperkalemia  -Fluid bolus given, trend labs     #Essential hypertension  #Hyperlipidemia  #Type 2 diabetes  -Holding home antihypertensives,  sliding scale coverage for now.     Diet: npo until SLP  DVT ppx: -  IVF:   Consults: neurology  Dispo: telemetry    Principal Problem:    Dizziness     Balaji Dubose DO

## 2023-10-17 NOTE — CONSULTS
Inpatient consult to Neurology  Consult performed by: Julia Garcia DO  Consult ordered by: Jai Mendoza DO  Reason for consult: Dizziness        Reason For Consult  Dizziness    History Of Present Illness  Rola Esparza is a 68 y.o. female with past medical history of hypertension, hyperlipidemia, chronic dizziness, type 2 diabetes mellitus presented to [facility] for right arm numbness and dizziness.  Patient had cholecystectomy about 2 weeks ago.  Over the last few days, patient has noted persistent dizziness.  She states that the dizziness began when she was lying in bed and turned her head a certain way.  Normally the patient has these dizzy spells, but they go away after 1 day.  The patient states that this dizzy spell has been persistent and has not gone away.  She also complains of a headache, which was worse on admission, it is now improved.  Patient also complains of right arm numbness that began when her dizziness began a few days ago.  The right arm numbness resolved on its own. Patient had an episode of emesis prior to arrival in the ED.  Stroke team was called in the ED.  Her initial NIH was 0.  CT/CTA were negative for acute infarct, stenosis or large vessel cutoff. Brain MRI demonstrated No evidence of acute infarct, intracranial mass effect or midline shift.  The patient states that her dizziness is still present, however it is better than previously.  Moving her head makes the dizziness worse.  Her arm is no longer numb.    Past Medical History  Hypertension, hyperlipidemia, diabetes mellitus    Surgical History  She has a past surgical history that includes Cholecystectomy.     Social History  She reports that she has never smoked. She has never used smokeless tobacco. She reports that she does not drink alcohol and does not use drugs.    Family History  Pancreatic cancer, rheumatoid arthritis and asthma     Allergies  Patient has no known allergies.    Review of Systems   Constitutional:  Negative.    HENT: Negative.     Eyes: Negative.    Respiratory: Negative.     Cardiovascular: Negative.    Gastrointestinal: Negative.    Endocrine: Negative.    Genitourinary: Negative.    Musculoskeletal: Negative.    Allergic/Immunologic: Negative.    Neurological:  Positive for dizziness.   Hematological: Negative.    Psychiatric/Behavioral: Negative.          Physical Exam  Constitutional:       Appearance: She is not toxic-appearing.   HENT:      Head: Normocephalic and atraumatic.   Eyes:      Extraocular Movements: Extraocular movements intact.      Pupils: Pupils are equal, round, and reactive to light.   Cardiovascular:      Rate and Rhythm: Normal rate and regular rhythm.   Pulmonary:      Effort: Pulmonary effort is normal.   Abdominal:      General: Abdomen is flat.      Palpations: Abdomen is soft.   Musculoskeletal:      Cervical back: Normal range of motion and neck supple.      Right lower leg: No edema.      Left lower leg: No edema.   Skin:     General: Skin is warm and dry.   Neurological:      Mental Status: She is alert and oriented to person, place, and time.      Cranial Nerves: Cranial nerves 2-12 are intact. No cranial nerve deficit.      Sensory: Sensation is intact.      Motor: No weakness, tremor, seizure activity or pronator drift.      Coordination: Coordination normal.      Deep Tendon Reflexes: Reflexes normal.      Comments: Dizziness worsened by head movement, no nystagmus noted on head impulse testing. Negative phalen and tinel testing.    Psychiatric:         Mood and Affect: Mood normal.         Behavior: Behavior normal.          Last Recorded Vitals  /80   Pulse 92   Temp 36.3 °C (97.3 °F)   Resp 17   Wt 56.7 kg (125 lb)   SpO2 97%     Relevant Results  MR brain w and wo IV contrast    Result Date: 10/16/2023  Interpreted By:  Harman Turner, STUDY: MR BRAIN W AND WO IV CONTRAST;  10/16/2023 7:46 pm   INDICATION: Signs/Symptoms:RUE numbness, headache, CVA r/o.    COMPARISON: CT head and CT angiogram dated 10/16/2022.   ACCESSION NUMBER(S): [id number]   ORDERING CLINICIAN: DIANA COLON   TECHNIQUE: Axial T2, FLAIR, DWI, gradient echo T2 and sagittal and coronal T1 weighted images of brain were acquired. Post-contrast imaging of the brain was obtained after administration of 11 mL Dotarem intravenous contrast.   FINDINGS: Brain MRI: There is no evidence of acute infarction. There are no extra-axial collections. There is no mass lesion and no midline shift. There is no hydrocephalus. There are mild cerebral involutional changes. There is a small area of linear hyperintensity in the left posterior periatrial region, image 21 of 40 without associated diffusion restriction, susceptibility artifact, or enhancement. This is nonspecific and may represent sequela of mild chronic microvascular ischemic change, migraines, or other etiologies. There are no other significant brain parenchymal abnormalities. No abnormal intracranial enhancement. Intracranial flow voids are maintained.   Paranasal Sinuses and Mastoids: Visualized paranasal sinuses are well aerated. The mastoid air cells are clear. The orbits are grossly normal.             No evidence of acute infarct, intracranial mass effect or midline shift. There is a small area of linear FLAIR hyperintensity in the left periatrial region, which may represent sequela of mild chronic microvascular ischemic change, migraines, or other etiologies. No evidence of intracranial hemorrhage, abnormal enhancement, or significant parenchymal abnormality.   Signed by: Harman Turner 10/16/2023 8:52 PM Dictation workstation:   [id number]    CT abdomen pelvis wo IV contrast    Result Date: 10/16/2023  Interpreted By:  Umu Blum, STUDY: CT ABDOMEN PELVIS WO IV CONTRAST;  10/16/2023 5:20 pm   INDICATION: Signs/Symptoms:Abdominal pain, recent cholecystectomy.   COMPARISON: None.   ACCESSION NUMBER(S): [id number]   ORDERING CLINICIAN:  DIANA COLON   TECHNIQUE: CT of the abdomen and pelvis was performed without IV contrast. Sagittal and coronal reconstructions.   FINDINGS: Limited evaluation for solid organs and vasculature without intravenous contrast. Evaluation limited by motion artifact.   Lower Chest: Clear.   Liver: The liver is unremarkable without focal lesion.   Gallbladder and Biliary: Status post cholecystectomy.   Pancreas: No abnormality identified in the pancreas.   Spleen: No abnormality identified in the spleen.   Adrenals: No abnormality identified in either adrenal gland.   Urinary: Couple of small subcentimeter angiomyolipomas in the right kidney. Subcentimeter hypodensity in the right kidney, too small to characterize. Excreted contrast in the renal collecting systems, ureters, and bladder. No hydronephrosis.   Reproductive: Calcified uterine fibroids. Right ovarian cyst measuring 5.7 cm.   Gastrointestinal/Peritoneum: No small or large bowel obstruction in the visualized abdomen. In the abdomen, there is no extraluminal air. No significant free fluid. No evidence of acute appendicitis.   Vascular: Abdominal aorta is normal in caliber.   Lymphatics: No enlarged lymph nodes by size criteria.   MSK/Body Wall: No aggressive bony lesion identified.       No acute abnormality.   Large cyst in the right ovary. Recommend follow-up with nonemergent pelvic ultrasound.   Calcified uterine fibroids.   Signed by: Umu Blum 10/16/2023 6:11 PM Dictation workstation:   [id number]    CT brain attack head wo IV contrast    Result Date: 10/16/2023  Interpreted By:  Raymon Giles, STUDY: CT BRAIN ATTACK HEAD WO IV CONTRAST;  10/16/2023 12:20 pm   INDICATION: Signs/Symptoms:stroke, HA, rt sided weakness.   COMPARISON: None.   ACCESSION NUMBER(S): [id number]   ORDERING CLINICIAN: USAMA ANGEL   TECHNIQUE: Noncontrast axial CT scan of head was performed.   FINDINGS: Parenchyma: There is no intracranial hemorrhage. The grey-white  differentiation is intact. There is no mass effect or midline shift.   CSF Spaces: The ventricles, sulci and basal cisterns are within normal limits for age.   Extra-Axial Fluid: There is no extraaxial fluid collection.   Calvarium: The calvarium is unremarkable.   Paranasal sinuses: Mild scattered mucosal thickening, most pronounced within the right sphenoid sinus. Otherwise the visualized paranasal sinuses are well aerated.   Mastoids: Clear.   Orbits: Normal.   Soft tissues: Unremarkable.       No acute intracranial hemorrhage, mass effect, or CT apparent acute infarct.   MACRO: Raymon Giles discussed the significance and urgency of this critical finding by telephone with  Dr. Mendoza on 10/16/2023 at 12:43 pm. (**-RCF-**) Findings:  See findings.       Signed by: Raymon Giles 10/16/2023 12:44 PM Dictation workstation:   [id number]    CT angio brain attack head w IV contrast and post procedure    Result Date: 10/16/2023  Interpreted By:  Edwige Ha, STUDY: CT ANGIO BRAIN ATTACK HEAD W IV CONTRAST AND POST PROCEDURE; 10/16/2023 12:31 pm   INDICATION: Signs/Symptoms:stroke.   COMPARISON: None.   ACCESSION NUMBER(S): [id number]   ORDERING CLINICIAN: USAMA ANGEL   TECHNIQUE: 100 mL Omnipaque 350 was administered intravenously and axial images of the head and neck were acquired.  Coronal, sagittal, and 3-D reconstructions were provided for review.   FINDINGS:     CTA HEAD FINDINGS:   Anterior circulation: The bilateral intracranial internal carotid arteries, bilateral carotid terminals, bilateral proximal anterior and middle cerebral arteries are patent. There are small posterior communicating arteries bilaterally.   Posterior circulation: Bilateral intracranial vertebral arteries, vertebrobasilar junction, basilar artery and proximal posterior cerebral arteries are patent. The left vertebral artery is dominant.   CTA NECK FINDINGS:   The aortic arch is unremarkable.   Right carotid vessels: The common  carotid artery is patent. The carotid bifurcation is unremarkable without significant stenosis. The internal carotid artery in the neck is patent without significant stenosis.   Left carotid vessels: The common carotid artery is patent. The carotid bifurcation is unremarkable without significant stenosis. The internal carotid artery in the neck is patent without significant stenosis.   Vertebral vessels:  The visualized segments of the cervical vertebral arteries are patent. The left vertebral artery is dominant.   Limited images through the lung apices are predominantly clear. Degenerative changes of the cervical spine. Heterogeneous thyroid gland. Consider ultrasound of the thyroid to further evaluate if not already performed.       No evidence for significant stenosis of the cervical vessels.   No evidence for significant stenosis or large branch vessel cutoffs of the intracranial vessels.   Heterogeneous thyroid gland. Consider ultrasound of the thyroid to further evaluate if not already performed.   MACRO: None   Signed by: Edwige Ha 10/16/2023 12:42 PM Dictation workstation:   [id number]       Assessment/Plan     Rola Esparza is a 68 y.o. female with past medical history of hypertension, hyperlipidemia, chronic dizziness, type 2 diabetes mellitus presented to [facility] for right arm numbness and dizziness.     #Vestibular migraine  #HTN  #HLD  #Diabetes mellitus type 2    -Continue medical management of diabetes mellitus  -MRI negative for acute process  -Can administer Toradol and Reglan now to see if patient improves, 10 mg Reglan, 15 mg Toradol  -Recommend riboflavin 400 mg a day on an outpatient basis  -Recommend magnesium oxide 400 mg a day on an outpatient basis for headache prophylaxis  -Patient does not need statin as her cholesterol is well controlled  -If right upper extremity hand numbness returns, patient may need outpatient EMG  -Patient can follow-up with her PCP and [facility] a [facility]    Danielito Flores, DO

## 2023-10-17 NOTE — PROGRESS NOTES
Physical Therapy                 Therapy Communication Note    Patient Name: Rola Esparza  MRN: [id number]  Today's Date: 10/17/2023     Discipline: Physical Therapy    Missed Visit Reason:  (Spoke with pts RN and reported pt is having severe dizziness/vertigo with functional mobility and advised therapy to hold evaluation at this time. Will follow up for therapy evaluation once medically stable and able to participate.)    Missed Time: Attempt

## 2023-10-17 NOTE — PROGRESS NOTES
Occupational Therapy                 Therapy Communication Note    Patient Name: Rola Esparza  MRN: [id number]  Today's Date: 10/17/2023     Discipline: Occupational Therapy    Missed Visit Reason:  pt. Having increased dizziness, discussed with RN    Missed Time: Attempt    Comment:

## 2023-10-18 LAB
ANION GAP SERPL CALC-SCNC: 10 MMOL/L (ref 10–20)
BUN SERPL-MCNC: 14 MG/DL (ref 6–23)
CALCIUM SERPL-MCNC: 8.9 MG/DL (ref 8.6–10.3)
CHLORIDE SERPL-SCNC: 109 MMOL/L (ref 98–107)
CO2 SERPL-SCNC: 25 MMOL/L (ref 21–32)
CREAT SERPL-MCNC: 0.54 MG/DL (ref 0.5–1.05)
ERYTHROCYTE [DISTWIDTH] IN BLOOD BY AUTOMATED COUNT: 12.9 % (ref 11.5–14.5)
GFR SERPL CREATININE-BSD FRML MDRD: >90 ML/MIN/1.73M*2
GLUCOSE BLD MANUAL STRIP-MCNC: 128 MG/DL (ref 74–99)
GLUCOSE BLD MANUAL STRIP-MCNC: 133 MG/DL (ref 74–99)
GLUCOSE BLD MANUAL STRIP-MCNC: 91 MG/DL (ref 74–99)
GLUCOSE SERPL-MCNC: 114 MG/DL (ref 74–99)
HCT VFR BLD AUTO: 39 % (ref 36–46)
HGB BLD-MCNC: 13 G/DL (ref 12–16)
MAGNESIUM SERPL-MCNC: 2.05 MG/DL (ref 1.6–2.4)
MCH RBC QN AUTO: 30 PG (ref 26–34)
MCHC RBC AUTO-ENTMCNC: 33.3 G/DL (ref 32–36)
MCV RBC AUTO: 90 FL (ref 80–100)
NRBC BLD-RTO: 0 /100 WBCS (ref 0–0)
PLATELET # BLD AUTO: 209 X10*3/UL (ref 150–450)
PMV BLD AUTO: 10 FL (ref 7.5–11.5)
POTASSIUM SERPL-SCNC: 3.8 MMOL/L (ref 3.5–5.3)
RBC # BLD AUTO: 4.33 X10*6/UL (ref 4–5.2)
SODIUM SERPL-SCNC: 140 MMOL/L (ref 136–145)
WBC # BLD AUTO: 7 X10*3/UL (ref 4.4–11.3)

## 2023-10-18 PROCEDURE — 36415 COLL VENOUS BLD VENIPUNCTURE: CPT

## 2023-10-18 PROCEDURE — 97165 OT EVAL LOW COMPLEX 30 MIN: CPT | Mod: GO

## 2023-10-18 PROCEDURE — 83735 ASSAY OF MAGNESIUM: CPT

## 2023-10-18 PROCEDURE — 1200000002 HC GENERAL ROOM WITH TELEMETRY DAILY

## 2023-10-18 PROCEDURE — 2500000001 HC RX 250 WO HCPCS SELF ADMINISTERED DRUGS (ALT 637 FOR MEDICARE OP): Performed by: STUDENT IN AN ORGANIZED HEALTH CARE EDUCATION/TRAINING PROGRAM

## 2023-10-18 PROCEDURE — 2500000004 HC RX 250 GENERAL PHARMACY W/ HCPCS (ALT 636 FOR OP/ED): Performed by: STUDENT IN AN ORGANIZED HEALTH CARE EDUCATION/TRAINING PROGRAM

## 2023-10-18 PROCEDURE — 99223 1ST HOSP IP/OBS HIGH 75: CPT | Performed by: INTERNAL MEDICINE

## 2023-10-18 PROCEDURE — 2500000002 HC RX 250 W HCPCS SELF ADMINISTERED DRUGS (ALT 637 FOR MEDICARE OP, ALT 636 FOR OP/ED)

## 2023-10-18 PROCEDURE — 82947 ASSAY GLUCOSE BLOOD QUANT: CPT

## 2023-10-18 PROCEDURE — C9113 INJ PANTOPRAZOLE SODIUM, VIA: HCPCS | Performed by: STUDENT IN AN ORGANIZED HEALTH CARE EDUCATION/TRAINING PROGRAM

## 2023-10-18 PROCEDURE — 80048 BASIC METABOLIC PNL TOTAL CA: CPT

## 2023-10-18 PROCEDURE — 85027 COMPLETE CBC AUTOMATED: CPT

## 2023-10-18 PROCEDURE — 97162 PT EVAL MOD COMPLEX 30 MIN: CPT | Mod: GP

## 2023-10-18 PROCEDURE — 2500000001 HC RX 250 WO HCPCS SELF ADMINISTERED DRUGS (ALT 637 FOR MEDICARE OP)

## 2023-10-18 RX ORDER — MECLIZINE HYDROCHLORIDE 25 MG/1
25 TABLET ORAL EVERY 6 HOURS
Status: DISCONTINUED | OUTPATIENT
Start: 2023-10-18 | End: 2023-10-19 | Stop reason: HOSPADM

## 2023-10-18 RX ORDER — KETOROLAC TROMETHAMINE 30 MG/ML
15 INJECTION, SOLUTION INTRAMUSCULAR; INTRAVENOUS ONCE
Status: COMPLETED | OUTPATIENT
Start: 2023-10-18 | End: 2023-10-18

## 2023-10-18 RX ORDER — DIAZEPAM 5 MG/1
2.5 TABLET ORAL ONCE
Status: COMPLETED | OUTPATIENT
Start: 2023-10-18 | End: 2023-10-18

## 2023-10-18 RX ORDER — MAGNESIUM SULFATE 1 G/100ML
1 INJECTION INTRAVENOUS ONCE
Status: COMPLETED | OUTPATIENT
Start: 2023-10-18 | End: 2023-10-18

## 2023-10-18 RX ORDER — PANTOPRAZOLE SODIUM 40 MG/1
40 TABLET, DELAYED RELEASE ORAL
Status: DISCONTINUED | OUTPATIENT
Start: 2023-10-19 | End: 2023-10-19 | Stop reason: HOSPADM

## 2023-10-18 RX ORDER — MECLIZINE HYDROCHLORIDE 25 MG/1
12.5 TABLET ORAL ONCE
Status: COMPLETED | OUTPATIENT
Start: 2023-10-18 | End: 2023-10-18

## 2023-10-18 RX ADMIN — MECLIZINE HYDROCHLORIDE 12.5 MG: 25 TABLET ORAL at 12:28

## 2023-10-18 RX ADMIN — KETOROLAC TROMETHAMINE 15 MG: 30 INJECTION, SOLUTION INTRAMUSCULAR; INTRAVENOUS at 14:10

## 2023-10-18 RX ADMIN — DIAZEPAM 2.5 MG: 5 TABLET ORAL at 12:28

## 2023-10-18 RX ADMIN — PANTOPRAZOLE SODIUM 40 MG: 40 INJECTION, POWDER, FOR SOLUTION INTRAVENOUS at 09:36

## 2023-10-18 RX ADMIN — MAGNESIUM SULFATE HEPTAHYDRATE 1 G: 1 INJECTION, SOLUTION INTRAVENOUS at 14:11

## 2023-10-18 RX ADMIN — MECLIZINE HYDROCHLORIDE 12.5 MG: 25 TABLET ORAL at 14:10

## 2023-10-18 RX ADMIN — SODIUM CHLORIDE 1000 ML: 9 INJECTION, SOLUTION INTRAVENOUS at 14:10

## 2023-10-18 RX ADMIN — MECLIZINE HYDROCHLORIDE 25 MG: 25 TABLET ORAL at 20:53

## 2023-10-18 RX ADMIN — MECLIZINE HYDROCHLORIDE 12.5 MG: 25 TABLET ORAL at 02:12

## 2023-10-18 RX ADMIN — ATORVASTATIN CALCIUM 80 MG: 80 TABLET, FILM COATED ORAL at 20:54

## 2023-10-18 RX ADMIN — MECLIZINE HYDROCHLORIDE 12.5 MG: 25 TABLET ORAL at 09:34

## 2023-10-18 ASSESSMENT — COGNITIVE AND FUNCTIONAL STATUS - GENERAL
HELP NEEDED FOR BATHING: A LITTLE
STANDING UP FROM CHAIR USING ARMS: A LITTLE
DRESSING REGULAR LOWER BODY CLOTHING: A LITTLE
CLIMB 3 TO 5 STEPS WITH RAILING: A LITTLE
TOILETING: A LITTLE
DAILY ACTIVITIY SCORE: 21
MOVING TO AND FROM BED TO CHAIR: A LITTLE
TURNING FROM BACK TO SIDE WHILE IN FLAT BAD: A LITTLE
WALKING IN HOSPITAL ROOM: A LITTLE
MOBILITY SCORE: 19

## 2023-10-18 ASSESSMENT — PAIN - FUNCTIONAL ASSESSMENT: PAIN_FUNCTIONAL_ASSESSMENT: 0-10

## 2023-10-18 ASSESSMENT — PAIN SCALES - GENERAL: PAINLEVEL_OUTOF10: 0 - NO PAIN

## 2023-10-18 NOTE — PROGRESS NOTES
Physical Therapy    Physical Therapy Evaluation    Patient Name: Rola Esparza  MRN: [id number]  Today's Date: 10/18/2023   Time Calculation  Start Time: 1102  Stop Time: 1116  Time Calculation (min): 14 min    Assessment/Plan   PT Assessment  PT Assessment Results: Impaired balance, Decreased mobility  End of Session Patient Position:  (Supine in bed with all needs in reach and no complaints noted.)  IP OR SWING BED PT PLAN  Inpatient or Swing Bed: Inpatient  PT Plan  Treatment/Interventions: Bed mobility, Transfer training, Gait training  PT Plan: Skilled PT  PT Frequency: 2 times per week  PT Discharge Recommendations: Low intensity level of continued care (with 24 hr SUP and OP vestibular therapy)  Equipment Recommended upon Discharge: Wheeled walker    Subjective     Current Problem:  1. Dizziness  Transthoracic Echo (TTE) Complete    Transthoracic Echo (TTE) Complete    CANCELED: Transthoracic Echo (TTE) Complete    CANCELED: Transthoracic Echo (TTE) Complete        Past Medical History:   Diagnosis Date    Arthritis     Diabetes mellitus (CMS/HCC)     High cholesterol      Past Surgical History:   Procedure Laterality Date    CHOLECYSTECTOMY       General Visit Information:  General  Reason for Referral: PT Eval and Treat  Referred By: Zane Lee DO  Missed Visit: Yes  Missed Visit Reason:  (Spoke with pts RN and reported pt is having severe dizziness/vertigo with functional mobility and advised therapy to hold evaluation at this time. Will follow up for therapy evaluation once medically stable and able to participate.)  Prior to Session Communication: Bedside nurse  Patient Position Received:  (Supine in bed and agreeable to PT)    Home Living:  Home Living  Home Living Comments: Pt lives alone in an apt with elevator access.    Prior Level of Function:  Prior Function Per Pt/Caregiver Report  Level of [place]: Independent with ADLs and functional transfers, Independent with homemaking with ambulation  (Pt's daughter lives nearby and is available to assist, pt does not drive)    Precautions:  Precautions  Precautions Comment: Fall Risk 2/2 dizziness    Objective     Pain:  Pain Assessment  Pain Assessment:  (Some residual abdominal pain from taiwo 3 wks ago, pt did not quantify)    Cognition:  Cognition  Overall Cognitive Status: Within Functional Limits    General Assessments:  Sensation  Light Touch: No apparent deficits  Strength  Strength Comments: B LE ROM and Strength WFL  Dynamic Standing Balance  Dynamic Standing-Comments: Fair- with RW    Functional Assessments:     Bed Mobility  Bed Mobility:  (supine <> sitting: SBA)  Transfers  Transfer:  (STS from EOB: SBA to RW)  Ambulation/Gait Training  Ambulation/Gait Training Performed:  (Pt was able to amb 40' x 2 using RW with CGA for safety demonstrating reduced kimberlyn and narrow TUAN. Pt noted increased dizziness with any change in position or direction.)     Outcome Measures:  [facility] Basic Mobility  Turning from your back to your side while in a flat bed without using bedrails: None  Moving from lying on your back to sitting on the side of a flat bed without using bedrails: A little  Moving to and from bed to chair (including a wheelchair): A little  Standing up from a chair using your arms (e.g. wheelchair or bedside chair): A little  To walk in hospital room: A little  Climbing 3-5 steps with railing: A little  Basic Mobility - Total Score: 19     Goals:  Encounter Problems       Encounter Problems (Active)       PT Problem       STG - Pt will transition supine <> sitting with SUP  (Progressing)       Start:  10/18/23    Expected End:  11/01/23            STG - Pt will transfer STS with SUP  (Progressing)       Start:  10/18/23    Expected End:  11/01/23            STG - Pt will amb 50' using no AD or least restrictive device with SUP  (Progressing)       Start:  10/18/23    Expected End:  11/01/23                 Education Documentation  Precautions,  taught by Preethi Freed PT at 10/18/2023 12:57 PM.  Learner: Patient  Readiness: Acceptance  Method: Explanation  Response: Verbalizes Understanding    Mobility Training, taught by Preethi Freed PT at 10/18/2023 12:57 PM.  Learner: Patient  Readiness: Acceptance  Method: Explanation  Response: Verbalizes Understanding    Education Comments  No comments found.

## 2023-10-18 NOTE — CONSULTS
Consults  History Of Present Illness:    Rola Esparza is a 68 y.o. female with PMH of HTN, HLD, chronic dizziness, type 2 DM presented to [facility] for right arm numbness and dizziness.  She had a cholecystetomy about 2 weeks ago and over the past few days prior to admission she has noted persistent dizziness. Sge also had a headache which was worse on admission but subsequently improvede echocardiogram showed and inter atrial aneurysm with a small PFO.  CT/CTA were negative.     Last Recorded Vitals:  Vitals:    10/17/23 1931 10/17/23 2354 10/18/23 0808 10/18/23 1602   BP: 118/79 105/64 113/77 102/66   BP Location:   Right arm Right arm   Patient Position: Sitting  Lying Lying   Pulse: 96 74 89 89   Resp: 16 16 16 16   Temp: 36.4 °C (97.5 °F) 35.9 °C (96.6 °F) 36.2 °C (97.2 °F) 36.5 °C (97.7 °F)   TempSrc: Temporal Temporal Temporal Temporal   SpO2: 96% 98% 97% 96%   Weight:       Height:           Last Labs:  CBC - 10/18/2023:  5:08 AM  7.0 13.0 209    39.0      CMP - 10/18/2023:  5:08 AM  8.9 7.7 43 --- 0.8   _ 5.2 33 34      PTT - 10/16/2023: 12:44 PM  1.0   11.8 29     Troponin I, High Sensitivity   Date/Time Value Ref Range Status   10/16/2023 12:44 PM 3 0 - 13 ng/L Final     Hemoglobin A1C   Date/Time Value Ref Range Status   10/09/2023 03:48 PM 5.6 4.0 - 5.6 % Final   03/06/2023 01:13 PM 5.6 4.3 - 5.6 % Final     Comment:     American Diabetes Association guidelines indicate that patients with HgbA1c in the range 5.7-6.4% are at increased risk for development of diabetes, and intervention by lifestyle modification may be beneficial. HgbA1c greater or equal to 6.5% is considered diagnostic of diabetes.   10/31/2022 02:38 PM 5.6 4.0 - 5.6 % Final   09/28/2020 11:37 AM 6.0 (H) 4.3 - 5.6 % Final     Comment:     American Diabetes Association guidelines indicate that patients with HgbA1c in   the range 5.7-6.4% are at increased risk for development of diabetes, and   intervention by lifestyle modification may be  "beneficial. HgbA1c greater or   equal to 6.5% is considered diagnostic of diabetes.     LDL Calculated   Date/Time Value Ref Range Status   10/17/2023 04:52 AM 56 <=99 mg/dL Final     Comment:                                 Near   Borderline      AGE      Desirable  Optimal    High     High     Very High     0-19 Y     0 - 109     ---    110-129   >/= 130     ----    20-24 Y     0 - 119     ---    120-159   >/= 160     ----      >24 Y     0 -  99   100-129  130-159   160-189     >/=190       VLDL   Date/Time Value Ref Range Status   10/17/2023 04:52 AM 14 0 - 40 mg/dL Final      Last I/O:  I/O last 3 completed shifts:  In: 2838.3 (50.1 mL/kg) [I.V.:1838.3 (32.4 mL/kg); IV Piggyback:1000]  Out: - (0 mL/kg)   Weight: 56.7 kg     Past Cardiology Tests (Last 3 Years):  EKG:  No results found for this or any previous visit from the past 1095 days.    Echo:  Transthoracic Echo (TTE) Complete 10/17/2023    Ejection Fractions:  No results found for: \"EF\"  Cath:  No results found for this or any previous visit from the past 1095 days.    Stress Test:  No results found for this or any previous visit from the past 1095 days.    Cardiac Imaging:  No results found for this or any previous visit from the past 1095 days.      Past Medical History:  She has a past medical history of Arthritis, Diabetes mellitus (CMS/HCC), and High cholesterol.    Past Surgical History:  She has a past surgical history that includes Cholecystectomy.      Social History:  She reports that she has never smoked. She has never used smokeless tobacco. She reports that she does not drink alcohol and does not use drugs.    Family History:  No family history on file.     Allergies:  Patient has no known allergies.    Inpatient Medications:  Scheduled medications   Medication Dose Route Frequency    atorvastatin  80 mg oral Nightly    insulin lispro  0-5 Units subcutaneous TID with meals    meclizine  25 mg oral q6h    [START ON 10/19/2023] pantoprazole  40 " mg oral Daily before breakfast     PRN medications   Medication     Continuous Medications   Medication Dose Last Rate     Outpatient Medications:  Current Outpatient Medications   Medication Instructions    alendronate (Fosamax) 70 mg tablet 1 tablet, oral, Weekly, On Wednesday    atorvastatin (LIPITOR) 40 mg, oral, Daily RT    calcium carbonate-vitamin D3 600 mg-5 mcg (200 unit) tablet 1 tablet, oral, Daily    cyanocobalamin (VITAMIN B-12) 1,000 mcg, oral, Daily RT    ergocalciferol (Vitamin D-2) 1.25 MG (25124 UT) capsule 1 capsule, oral, Weekly, On Tuesday    meloxicam (Mobic) 15 mg tablet 1 tablet, oral, Daily PRN    metFORMIN XR (GLUCOPHAGE-XR) 1,000 mg, oral, 2 times daily with meals    multivitamin with minerals tablet 1 tablet, oral, Daily RT    omeprazole (PRILOSEC) 40 mg, oral, Daily RT       Physical Exam:  Gen: Wd WN 68 yof in NAD  Skin: Warm, good turgor  HEENT: Atraumatic  Neck No bruits, no JVD  Cor: Reg  Lungs: Clear  Abd: Soft  Ext: Wrm, no edema     Assessment/Plan   IMP 1.) Interatrial septal aneurysm with small PFO may be and incidental finding uless it is felt the dizziness/headach are migraine equivalents in which case closure may be of benefit          2.) HTN          3.) HLD          4.) S/P Cholecystectomy               Thank you for the consultatin                                    Will follow with you                                                       JLS  Peripheral IV 10/16/23 20 G Right Antecubital (Active)   Site Assessment Clean;Dry;Intact 10/18/23 1600   Dressing Status Clean;Dry 10/18/23 1600   Number of days: 2       Code Status:  Full Code    I spent 30 minutes in the professional and overall care of this patient.        Kartik Jones MD

## 2023-10-18 NOTE — PROGRESS NOTES
Rola Esparza is a 68 y.o. female on day 2 of admission presenting with Dizziness.      Subjective  Patient was seen at bedside today, she complained of persistent headache with improvement of her vertigo.  Denies any chest pain, shortness of breath, abdominal pain.       Objective     Last Recorded Vitals  /77 (BP Location: Right arm, Patient Position: Lying)   Pulse 89   Temp 36.2 °C (97.2 °F) (Temporal)   Resp 16   Wt 56.7 kg (125 lb)   SpO2 97%   Intake/Output last 3 Shifts:    Intake/Output Summary (Last 24 hours) at 10/18/2023 1507  Last data filed at 10/17/2023 1836  Gross per 24 hour   Intake 948.33 ml   Output --   Net 948.33 ml         Admission Weight  Weight: 59 kg (130 lb) (10/16/23 1227)    Daily Weight  10/16/23 : 56.7 kg (125 lb)    Physical Exam  GENERAL: Awake, cooperative, no apparent distress.  HEAD:  Normocephalic, atraumatic.  EYES:  Round and reactive.  ENT:  No nasal discharge, mucous membranes dry.  NECK:  Atraumatic, no meningismus.  CARDIOVASCULAR: Tachycardic, regular rate,, no murmur.  RESPIRATORY: CTAB, no active work of breathing.   ABDOMEN:  Soft, no tenderness, nondistended.  EXTREMITIES:  No peripheral edema, no calf tenderness.  SKIN:  Warm and dry.  NEUROLOGICAL: Awake/alert, no evidence of dysarthria, motor and sensation intact and symmetrical bilaterally.         Assessment/Plan   Patient is a 68-year-old female with a past medical history of hypertension, hyperlipidemia, chronic dizziness, type 2 diabetes mellitus who presented to [facility] for dizziness and right arm numbness.    Progress:  10/18: Patient still complaining of headache, 2.5mg Valium, IV magnesium and IV fluids given.  Will consider Reglan and increasing meclizine dose if headache continues.  Cardiology consulted for incidental echo finding.    #Vertigo  #Vestibular migraine  CVA ruled out  -MRI brain negative for infarct.  -Neurology consulted  -Meclizine 25 mg every 6 hours  -Patient will likely need  vestibular therapy outpatient     #Dehydration  #Hyperkalemia  -Fluid bolus given, trend labs     #Essential hypertension  #Hyperlipidemia  #Type 2 diabetes  -Holding home antihypertensives, sliding scale coverage for now.     Diet: Regular  DVT ppx: -  Consults: neurology  Dispo: telemetry    Principal Problem:    Dizziness     Balaji Dubose DO

## 2023-10-18 NOTE — CARE PLAN
Problem: Fall/Injury  Goal: Not fall by end of shift  Outcome: Progressing  Goal: Be free from injury by end of the shift  Outcome: Progressing  Goal: Verbalize understanding of personal risk factors for fall in the hospital  Outcome: Progressing  Goal: Verbalize understanding of risk factor reduction measures to prevent injury from fall in the home  Outcome: Progressing  Goal: Use assistive devices by end of the shift  Outcome: Progressing  Goal: Pace activities to prevent fatigue by end of the shift  Outcome: Progressing     Problem: Pain  Goal: Takes deep breaths with improved pain control throughout the shift  Outcome: Progressing  Goal: Turns in bed with improved pain control throughout the shift  Outcome: Progressing  Goal: Walks with improved pain control throughout the shift  Outcome: Progressing  Goal: Performs ADL's with improved pain control throughout shift  Outcome: Progressing  Goal: Participates in PT with improved pain control throughout the shift  Outcome: Progressing  Goal: Free from opioid side effects throughout the shift  Outcome: Progressing  Goal: Free from acute confusion related to pain meds throughout the shift  Outcome: Progressing     Problem: Daily Care  Goal: Daily care needs are met  Outcome: Progressing     Problem: Psychosocial Needs  Goal: Demonstrates ability to cope with hospitalization/illness  Outcome: Progressing  Goal: Collaborate with me, my family, and caregiver to identify my specific goals  Outcome: Progressing     Problem: Discharge Barriers  Goal: My discharge needs are met  Outcome: Progressing   The patient's goals for the shift include free from falls    The clinical goals for the shift include pt will remain safe

## 2023-10-18 NOTE — PROGRESS NOTES
Occupational Therapy    Evaluation    Patient Name: Rola Esparza  MRN: [id number]  Today's Date: 10/18/2023  Time Calculation  Start Time: 1101  Stop Time: 1120  Time Calculation (min): 19 min        Assessment:  End of Session Patient Position:  (Supine in bed with all needs in reach and no complaints noted.)     Plan:  Treatment Interventions: ADL retraining, Functional transfer training, UE strengthening/ROM, Compensatory technique education  OT Frequency: 3 times per week  OT Discharge Recommendations: Low intensity level of continued care  Treatment Interventions: ADL retraining, Functional transfer training, UE strengthening/ROM, Compensatory technique education    Subjective   Current Problem:  1. Dizziness  Transthoracic Echo (TTE) Complete    Transthoracic Echo (TTE) Complete    CANCELED: Transthoracic Echo (TTE) Complete    CANCELED: Transthoracic Echo (TTE) Complete        General:  General  Reason for Referral: impaired adl  Referred By: Zane Lee  Past Medical History Relevant to Rehab: pt. admitted due to dizziness, mri brain:  (-), neuro on consult  Prior to Session Communication: Bedside nurse  Patient Position Received: Bed, 2 rail up, Alarm off, not on at start of session  General Comment: pt. returned to bed at conclusion of session, call button within reach, all needs met  Precautions:  Precautions Comment: falls, dizziness, mild language barrier  Vital Signs:     Pain:       Objective   Cognition:  Overall Cognitive Status: Within Functional Limits           Home Living:  Home Living Comments: pt. lives alone in an apt, elevator access, shower chair and gb  Prior Function:  Prior Function Comments: daughter and son assist with driving/shopping, pt. able to complete iadl tasks independently within apt  IADL History:     ADL:  ADL Comments: pt. demonstrates sufficient dynamic standing balance to complete standing aspects of adl tasks at cga level.  would anticipate seated aspects of adl to  require sba  Activity Tolerance:     Bed Mobility/Transfers: Bed Mobility  Bed Mobility:  (sba supine <> sit)   and Transfers  Transfer:  (sit<> stand from eob requires sba, cga for mobility via cga, pt. unable to scan environment effectively due to dizziness with head position changes or change of direction)      Strength:  Strength Comments: bue arom/strength wfl    Outcome Measures:[facility] Daily Activity  Putting on and taking off regular lower body clothing: A little  Bathing (including washing, rinsing, drying): A little  Putting on and taking off regular upper body clothing: None  Toileting, which includes using toilet, bedpan or urinal: A little  Taking care of personal grooming such as brushing teeth: None  Eating Meals: None  Daily Activity - Total Score: 21        Education Documentation  Body Mechanics, taught by Zo Connor OT at 10/18/2023  1:28 PM.  Learner: Patient  Readiness: Acceptance  Method: Explanation  Response: Verbalizes Understanding, Needs Reinforcement    Precautions, taught by Zo Connor OT at 10/18/2023  1:28 PM.  Learner: Patient  Readiness: Acceptance  Method: Explanation  Response: Verbalizes Understanding, Needs Reinforcement    ADL Training, taught by Zo Connor OT at 10/18/2023  1:28 PM.  Learner: Patient  Readiness: Acceptance  Method: Explanation  Response: Verbalizes Understanding, Needs Reinforcement    Education Comments  No comments found.        OP EDUCATION:       Goals:  Encounter Problems       Encounter Problems (Active)       OT Goals       increase standing tolerance x 3-5 minutes with supervision to promote greater activity tolerance for completion of adl/transfers  (Progressing)       Start:  10/18/23    Expected End:  10/25/23            increase dynamic standing balance to supervision for safety/assist with functional mobility/functional reach/adl  (Progressing)       Start:  10/18/23    Expected End:  10/25/23            Increase functional mobility and   functional transfers to supervision for bed/chair/toilet/shower with dme prn   (Progressing)       Start:  10/18/23    Expected End:  10/25/23

## 2023-10-19 ENCOUNTER — APPOINTMENT (OUTPATIENT)
Dept: CARDIOLOGY | Facility: HOSPITAL | Age: 68
End: 2023-10-19
Payer: COMMERCIAL

## 2023-10-19 VITALS
RESPIRATION RATE: 16 BRPM | BODY MASS INDEX: 23 KG/M2 | WEIGHT: 125 LBS | TEMPERATURE: 96.8 F | OXYGEN SATURATION: 98 % | HEART RATE: 100 BPM | HEIGHT: 62 IN | SYSTOLIC BLOOD PRESSURE: 123 MMHG | DIASTOLIC BLOOD PRESSURE: 78 MMHG

## 2023-10-19 PROBLEM — R42 DIZZINESS: Status: RESOLVED | Noted: 2023-10-16 | Resolved: 2023-10-19

## 2023-10-19 LAB
ANION GAP SERPL CALC-SCNC: 10 MMOL/L (ref 10–20)
BUN SERPL-MCNC: 13 MG/DL (ref 6–23)
CALCIUM SERPL-MCNC: 8.9 MG/DL (ref 8.6–10.3)
CHLORIDE SERPL-SCNC: 110 MMOL/L (ref 98–107)
CO2 SERPL-SCNC: 24 MMOL/L (ref 21–32)
CREAT SERPL-MCNC: 0.57 MG/DL (ref 0.5–1.05)
ERYTHROCYTE [DISTWIDTH] IN BLOOD BY AUTOMATED COUNT: 13 % (ref 11.5–14.5)
GFR SERPL CREATININE-BSD FRML MDRD: >90 ML/MIN/1.73M*2
GLUCOSE BLD MANUAL STRIP-MCNC: 111 MG/DL (ref 74–99)
GLUCOSE BLD MANUAL STRIP-MCNC: 116 MG/DL (ref 74–99)
GLUCOSE SERPL-MCNC: 109 MG/DL (ref 74–99)
HCT VFR BLD AUTO: 38.1 % (ref 36–46)
HGB BLD-MCNC: 12.7 G/DL (ref 12–16)
MAGNESIUM SERPL-MCNC: 1.98 MG/DL (ref 1.6–2.4)
MCH RBC QN AUTO: 30.1 PG (ref 26–34)
MCHC RBC AUTO-ENTMCNC: 33.3 G/DL (ref 32–36)
MCV RBC AUTO: 90 FL (ref 80–100)
NRBC BLD-RTO: 0 /100 WBCS (ref 0–0)
PLATELET # BLD AUTO: 183 X10*3/UL (ref 150–450)
PMV BLD AUTO: 10.4 FL (ref 7.5–11.5)
POTASSIUM SERPL-SCNC: 4.4 MMOL/L (ref 3.5–5.3)
RBC # BLD AUTO: 4.22 X10*6/UL (ref 4–5.2)
SODIUM SERPL-SCNC: 140 MMOL/L (ref 136–145)
WBC # BLD AUTO: 6.7 X10*3/UL (ref 4.4–11.3)

## 2023-10-19 PROCEDURE — 82947 ASSAY GLUCOSE BLOOD QUANT: CPT

## 2023-10-19 PROCEDURE — 36415 COLL VENOUS BLD VENIPUNCTURE: CPT

## 2023-10-19 PROCEDURE — 93005 ELECTROCARDIOGRAM TRACING: CPT

## 2023-10-19 PROCEDURE — 83735 ASSAY OF MAGNESIUM: CPT

## 2023-10-19 PROCEDURE — 85027 COMPLETE CBC AUTOMATED: CPT

## 2023-10-19 PROCEDURE — 2500000004 HC RX 250 GENERAL PHARMACY W/ HCPCS (ALT 636 FOR OP/ED): Performed by: STUDENT IN AN ORGANIZED HEALTH CARE EDUCATION/TRAINING PROGRAM

## 2023-10-19 PROCEDURE — 2500000001 HC RX 250 WO HCPCS SELF ADMINISTERED DRUGS (ALT 637 FOR MEDICARE OP): Performed by: STUDENT IN AN ORGANIZED HEALTH CARE EDUCATION/TRAINING PROGRAM

## 2023-10-19 PROCEDURE — 80048 BASIC METABOLIC PNL TOTAL CA: CPT

## 2023-10-19 RX ORDER — MECLIZINE HYDROCHLORIDE 25 MG/1
25 TABLET ORAL 3 TIMES DAILY PRN
Qty: 90 TABLET | Refills: 0 | Status: SHIPPED | OUTPATIENT
Start: 2023-10-19

## 2023-10-19 RX ORDER — ASPIRIN 81 MG/1
81 TABLET ORAL DAILY
Qty: 30 TABLET | Refills: 0 | Status: SHIPPED | OUTPATIENT
Start: 2023-10-19 | End: 2023-12-19

## 2023-10-19 RX ADMIN — MECLIZINE HYDROCHLORIDE 25 MG: 25 TABLET ORAL at 14:08

## 2023-10-19 RX ADMIN — PANTOPRAZOLE SODIUM 40 MG: 40 TABLET, DELAYED RELEASE ORAL at 08:39

## 2023-10-19 RX ADMIN — MECLIZINE HYDROCHLORIDE 25 MG: 25 TABLET ORAL at 02:26

## 2023-10-19 RX ADMIN — MECLIZINE HYDROCHLORIDE 25 MG: 25 TABLET ORAL at 08:39

## 2023-10-19 ASSESSMENT — COGNITIVE AND FUNCTIONAL STATUS - GENERAL
CLIMB 3 TO 5 STEPS WITH RAILING: A LITTLE
MOBILITY SCORE: 19
DAILY ACTIVITIY SCORE: 24
TURNING FROM BACK TO SIDE WHILE IN FLAT BAD: A LITTLE
WALKING IN HOSPITAL ROOM: A LITTLE
STANDING UP FROM CHAIR USING ARMS: A LITTLE
MOVING TO AND FROM BED TO CHAIR: A LITTLE

## 2023-10-19 ASSESSMENT — PAIN SCALES - GENERAL: PAINLEVEL_OUTOF10: 0 - NO PAIN

## 2023-10-19 NOTE — DISCHARGE INSTRUCTIONS
Follow-up with your primary care provider within 1 week of discharge  Follow-up with a cardiologist within 3 to 4 weeks of discharge to follow-up on echocardiogram findings.  Follow-up with a neurologist within 3 to 4 weeks of discharge for possible EMG.  Recommend taking riboflavin 400 mg once daily supplement  Recommend taking magnesium oxide 400 mg once daily supplement  You have been prescribed aspirin 81 mg once daily.  You have been prescribed meclizine 25 mg 3 times a day to be taken as needed for dizziness.

## 2023-12-19 LAB
ATRIAL RATE: 89 BPM
P AXIS: 41 DEGREES
P OFFSET: 179 MS
P ONSET: 127 MS
PR INTERVAL: 176 MS
Q ONSET: 215 MS
QRS COUNT: 15 BEATS
QRS DURATION: 96 MS
QT INTERVAL: 376 MS
QTC CALCULATION(BAZETT): 457 MS
QTC FREDERICIA: 428 MS
R AXIS: -45 DEGREES
T AXIS: 42 DEGREES
T OFFSET: 403 MS
VENTRICULAR RATE: 89 BPM

## 2024-12-11 NOTE — DISCHARGE SUMMARY
Discharge Diagnosis  Vestibular migraine  Vertigo  Hyperkalemia 2/2 dehydration, resolved  CVA rule out    Issues Requiring Follow-Up  PFO and atrial septal aneurysm finding on echo    Discharge Meds     Your medication list        START taking these medications        Instructions Last Dose Given Next Dose Due   aspirin 81 mg EC tablet      Take 1 tablet (81 mg) by mouth once daily.       meclizine 25 mg tablet  Commonly known as: Antivert      Take 1 tablet (25 mg) by mouth 3 times a day as needed for dizziness.              CONTINUE taking these medications        Instructions Last Dose Given Next Dose Due   alendronate 70 mg tablet  Commonly known as: Fosamax           atorvastatin 40 mg tablet  Commonly known as: Lipitor           calcium carbonate-vitamin D3 600 mg-5 mcg (200 unit) tablet           cyanocobalamin 1,000 mcg tablet  Commonly known as: Vitamin B-12           ergocalciferol 1.25 MG (85205 UT) capsule  Commonly known as: Vitamin D-2           meloxicam 15 mg tablet  Commonly known as: Mobic           metFORMIN  mg 24 hr tablet  Commonly known as: Glucophage-XR           multivitamin with minerals tablet           omeprazole 40 mg DR capsule  Commonly known as: PriLOSEC                     Where to Get Your Medications        These medications were sent to [facility]/pharmacy #[id number] - [address] AT [address] [place] [id number]      Phone: [phone]   aspirin 81 mg EC tablet  meclizine 25 mg tablet         Test Results Pending At Discharge  Pending Labs       No current pending labs.            Hospital Course   Patient is a 68-year-old female with past medical history of hypertension, hyperlipidemia, chronic dizziness, type 2 diabetes who presented to [facility] complaining of persistent dizziness and right arm numbness.  Patient had a cholecystectomy 2 weeks prior [facility] with no complications postop.  She reported a sensation  Yes... of the room spinning and that she has experienced this previously with resolution normally within 1 to 2 days.  CT head and MRI brain were negative for hemorrhage or infarct.  Patient reported headache associated with her symptoms.  Patient's potassium was elevated which resolved with IV fluids likely due to dehydration.  Neurology was consulted and suspected vestibular migraine.  Patient was given meclizine 3 times a day and a one-time dose of Valium with resolution of her symptoms and headache.  Echocardiogram showed PFO and atrial septal aneurysm, cardiology was consulted.  Patient reports feeling well with no complaints.  Upon discharge, she was prescribed aspirin 81 mg daily, meclizine 25 mg 3 times daily as needed for dizziness.  Neurology recommended riboflavin 400 mg daily, magnesium oxide 400 mg daily and follow-up with neurology.  Patient to be discharged home.  Upon discharge, patient should follow-up with her PCP within 1 week of discharge. She should follow-up with cardiology and neurology within 1 to 2 months of discharge         Pertinent Physical Exam At Time of Discharge  Physical Exam  General:  Pleasant and cooperative. No apparent distress.  HEENT:  Normocephalic, atraumatic, mucus membranes moist.   Neck:  Trachea midline.  No JVD.    Chest:  Clear to auscultation bilaterally. No wheezes, rales, or rhonchi.  CV:  Regular rate and rhythm.  Positive S1/S2.   Abdomen: Bowel sounds present in all four quadrants, abdomen is soft, non-tender, non-distended.  Extremities:  No lower extremity edema or cyanosis.   Neurological:  AAOx3. No focal deficits.  Skin:  Warm and dry.    Outpatient Follow-Up  No future appointments.      Balaji Dubose DO